# Patient Record
Sex: MALE | Race: WHITE | Employment: UNEMPLOYED | URBAN - METROPOLITAN AREA
[De-identification: names, ages, dates, MRNs, and addresses within clinical notes are randomized per-mention and may not be internally consistent; named-entity substitution may affect disease eponyms.]

---

## 2017-08-25 ENCOUNTER — HOSPITAL ENCOUNTER (OUTPATIENT)
Dept: LAB | Age: 37
Discharge: HOME | End: 2017-08-25

## 2017-08-25 LAB — SENTARA SPECIMEN COL,SENBCF: NORMAL

## 2017-08-25 PROCEDURE — 99001 SPECIMEN HANDLING PT-LAB: CPT | Performed by: PSYCHIATRY & NEUROLOGY

## 2019-05-17 ENCOUNTER — HOSPITAL ENCOUNTER (OUTPATIENT)
Dept: GENERAL RADIOLOGY | Age: 39
Discharge: HOME | End: 2019-05-17
Payer: MEDICAID

## 2019-05-17 DIAGNOSIS — M10.9 GOUT: ICD-10-CM

## 2019-05-17 PROCEDURE — 73620 X-RAY EXAM OF FOOT: CPT

## 2020-10-26 ENCOUNTER — HOSPITAL ENCOUNTER (OUTPATIENT)
Dept: GENERAL RADIOLOGY | Age: 40
Discharge: HOME | End: 2020-10-26
Payer: MEDICAID

## 2020-10-26 ENCOUNTER — TRANSCRIBE ORDER (OUTPATIENT)
Dept: REGISTRATION | Age: 40
End: 2020-10-26

## 2020-10-26 DIAGNOSIS — J32.9 SINUSITIS: Primary | ICD-10-CM

## 2020-10-26 DIAGNOSIS — J32.9 SINUSITIS: ICD-10-CM

## 2020-10-26 PROCEDURE — 71046 X-RAY EXAM CHEST 2 VIEWS: CPT

## 2021-05-19 ENCOUNTER — HOSPITAL ENCOUNTER (OUTPATIENT)
Dept: LAB | Age: 41
Discharge: HOME | End: 2021-05-19

## 2021-05-19 LAB — SENTARA SPECIMEN COL,SENBCF: NORMAL

## 2021-05-19 PROCEDURE — 99001 SPECIMEN HANDLING PT-LAB: CPT

## 2021-05-20 ENCOUNTER — HOSPITAL ENCOUNTER (OUTPATIENT)
Dept: ULTRASOUND IMAGING | Age: 41
Discharge: HOME | End: 2021-05-20
Attending: NURSE PRACTITIONER
Payer: MEDICAID

## 2021-05-20 DIAGNOSIS — N50.89 SCROTAL EDEMA: ICD-10-CM

## 2021-05-20 PROCEDURE — 76870 US EXAM SCROTUM: CPT

## 2021-07-09 ENCOUNTER — OFFICE VISIT (OUTPATIENT)
Dept: SURGERY | Age: 41
End: 2021-07-09
Payer: MEDICAID

## 2021-07-09 VITALS
HEIGHT: 71 IN | WEIGHT: 222.6 LBS | HEART RATE: 96 BPM | BODY MASS INDEX: 31.16 KG/M2 | SYSTOLIC BLOOD PRESSURE: 123 MMHG | DIASTOLIC BLOOD PRESSURE: 80 MMHG | OXYGEN SATURATION: 96 %

## 2021-07-09 DIAGNOSIS — K42.9 UMBILICAL HERNIA WITHOUT OBSTRUCTION AND WITHOUT GANGRENE: Primary | ICD-10-CM

## 2021-07-09 PROCEDURE — 99204 OFFICE O/P NEW MOD 45 MIN: CPT | Performed by: SURGERY

## 2021-07-09 NOTE — PROGRESS NOTES
[facility]  General Surgery    Subjective:      HPI: Patient is a very pleasant 26-year-old male with a past medical history remarkable for hypertension, depression and acid reflux. He was referred by Dr. Gloria Lanza for evaluation and management of umbilical hernia. Patient states he initially thought he had a mass in his bellybutton. He had no pain. Occasionally he bends over or coughs he has discomfort. No surgical history. He does not do heavy lifting but he does carry groceries on the bicycle up 3 flights of stairs regularly. There are no problems to display for this patient. Past Medical History:   Diagnosis Date    Acid indigestion     Depression     Essential hypertension     Headache(784.0)     Muscle ache     Stress       History reviewed. No pertinent surgical history. Family History   Family history unknown: Yes      Social History     Tobacco Use    Smoking status: Former Smoker    Smokeless tobacco: Never Used   Substance Use Topics    Alcohol use: Yes     Comment: tiffanie      Allergies   Allergen Reactions    Iodine Hives    Pcn [Penicillins] Hives       Prior to Admission medications    Medication Sig Start Date End Date Taking? Authorizing Provider   allopurinoL (ZYLOPRIM) 300 mg tablet  5/15/21  Yes Provider, Historical   tadalafiL (CIALIS) 5 mg tablet Take 1 Tablet by mouth daily as needed for Erectile Dysfunction. 5/25/21  Yes Constance Kat MD   albuterol (PROVENTIL HFA, VENTOLIN HFA, PROAIR HFA) 90 mcg/actuation inhaler Take 2 Puffs by inhalation every four (4) hours as needed for Wheezing or Shortness of Breath. 10/27/16  Yes Deidre Tyler PA   acetaminophen (TYLENOL) 325 mg tablet Take 2 Tabs by mouth every six (6) hours as needed for Pain. 10/27/16  Yes Deidre Tyler PA   guaiFENesin-codeine (CHERATUSSIN AC)  mg/5 mL solution Take 5 mL by mouth four (4) times daily as needed for Cough.  Max Daily Amount: 20 mL. 4/6/15 Yes Reyna Panchal   cetirizine (ZYRTEC) 10 mg tablet Take 1 Tab by mouth daily. 4/6/15  Yes COLE Kan   amLODIPine (NORVASC) 5 mg tablet TAKE ONE TABLET BY MOUTH DAILY 1/3/14  Yes Bea Olguin MD   lisinopril-hydrochlorothiazide (PRINZIDE, ZESTORETIC) 20-25 mg per tablet Take  by mouth daily. Yes Provider, Historical   OXcarbazepine (TRILEPTAL) 600 mg tablet Take  by mouth. Yes Provider, Historical       Review of Systems:    14 systems were reviewed. The results are as above in the HPI and otherwise negative. Objective:     Vitals:    07/09/21 1445   BP: 123/80   Pulse: 96   SpO2: 96%   Weight: 101 kg (222 lb 9.6 oz)   Height: 5' 11\" (1.803 m)       Physical Exam:  GENERAL: alert, cooperative, no distress, appears stated age,   EYE: conjunctivae/corneas clear. PERRL, EOM's intact. THROAT & NECK: normal and no erythema or exudates noted. ,    LYMPHATIC: Cervical, supraclavicular, and axillary nodes normal. ,   LUNG: clear to auscultation bilaterally,   HEART: regular rate and rhythm, S1, S2 normal, no murmur, click, rub or gallop,   ABDOMEN: soft, non-tender. Reducible umbilical hernia centimeter defect. Bowel sounds normal. No masses,  no organomegaly,   EXTREMITIES:  extremities normal, atraumatic, no cyanosis or edema,   SKIN: Normal.,   NEUROLOGIC: AOx3. Cranial nerves 2-12 and sensation grossly intact. ,     Data Review: None    Mr. Jose Daniel Mayers has a reminder for a \"due or due soon\" health maintenance. I have asked that he contact his primary care provider for follow-up on this health maintenance. Impression:     · Patient with reducible asymptomatic umbilical hernia. Plan:     · Patient should seek medical attention if the hernia becomes hard has read discoloration in the overlying skin or pain which cannot be controlled with Tylenol.   · Follow-up as needed    Signed By: Zoë Haque MD     July 9, 2021

## 2021-07-09 NOTE — PATIENT INSTRUCTIONS
Abdominal Hernia Repair: Before Your Surgery  What is abdominal hernia repair surgery? An abdominal hernia repair is a type of surgery. It fixes a problem called a hernia. A hernia is a bulge under the skin in your belly. It happens when you have a weak spot in your belly muscles and a piece of your intestines or tissues pokes through your muscles. This can cause pain. You may notice the pain most when you lift something heavy. You can have a hernia near your belly button. Or it may be in a scar from an earlier surgery. To fix it, the doctor will do one of two kinds of surgery. In open surgery, the doctor makes one cut near the hernia. This cut is called an incision. In laparoscopic surgery, the doctor makes several very small incisions and uses a thin, lighted scope and small tools. In either type of surgery, the doctor pushes the bulge back in place, if needed. Then the doctor sews the healthy tissue back together. Often the doctor patches the weak spot with a piece of material.  Laparoscopic surgery leaves several small scars. Open surgery leaves one long scar. The scars fade with time. You will probably need to take 1 to 2 weeks off from work. But if your job requires heavy lifting or other physical work, you may need to take 4 to 6 weeks off. Follow-up care is a key part of your treatment and safety. Be sure to make and go to all appointments, and call your doctor if you are having problems. It's also a good idea to know your test results and keep a list of the medicines you take. How do you prepare for the surgery? Surgery can be stressful. This information will help you understand what you can expect. And it will help you safely prepare for surgery. Preparing for surgery    · Be sure you have someone to take you home.  Anesthesia and pain medicine will make it unsafe for you to drive or get home on your own.     · Understand exactly what surgery is planned, along with the risks, benefits, and other options.     · Tell your doctor ALL the medicines, vitamins, supplements, and herbal remedies you take. Some may increase the risk of problems during your surgery. Your doctor will tell you if you should stop taking any of them before the surgery and how soon to do it.     · If you take aspirin or some other blood thinner, ask your doctor if you should stop taking it before your surgery. Make sure that you understand exactly what your doctor wants you to do. These medicines increase the risk of bleeding.     · Make sure your doctor and the hospital have a copy of your advance directive. If you don't have one, you may want to prepare one. It lets others know your health care wishes. It's a good thing to have before any type of surgery or procedure. .   What happens on the day of surgery? · Follow the instructions exactly about when to stop eating and drinking. If you don't, your surgery may be canceled. If your doctor told you to take your medicines on the day of surgery, take them with only a sip of water.     · Take a bath or shower before you come in for your surgery. Do not apply lotions, perfumes, deodorants, or nail polish.     · Do not shave the surgical site yourself.     · Take off all jewelry and piercings. And take out contact lenses, if you wear them. At the hospital or surgery center   · Bring a picture ID.     · The area for surgery is often marked to make sure there are no errors.     · You will be kept comfortable and safe by your anesthesia provider. You will be asleep during the surgery.     · The surgery will take 30 minutes to 2 hours. It depends on how large the hernia is and where it is. When should you call your doctor? · You have questions or concerns.     · You don't understand how to prepare for your surgery.     · You become ill before the surgery (such as fever, flu, or a cold).     · You need to reschedule or have changed your mind about having the surgery.    Where can you learn more?  Go to http://www.gray.com/  Enter U288 in the search box to learn more about \"Abdominal Hernia Repair: Before Your Surgery. \"  Current as of: April 15, 2020               Content Version: 12.8  © 8915-8058 Healthwise, [employer]. Care instructions adapted under license by [employer] software (which disclaims liability or warranty for this information). If you have questions about a medical condition or this instruction, always ask your healthcare professional. [address] any warranty or liability for your use of this information.

## 2021-07-09 NOTE — PROGRESS NOTES
Anel Aquino is a 46 y.o. male (: 1980) presenting to address:    Chief Complaint   Patient presents with    New Patient     Umbilical hernia x 2 years/ referred by Dr Anastacio Skiff       Medication list and allergies have been reviewed with Anel Aquino and updated as of today's date. I have gone over all Medical, Surgical and Social History with Anel Aquino and updated/added the information accordingly.

## 2021-11-30 ENCOUNTER — HOSPITAL ENCOUNTER (EMERGENCY)
Age: 41
Discharge: HOME | End: 2021-11-30
Attending: STUDENT IN AN ORGANIZED HEALTH CARE EDUCATION/TRAINING PROGRAM
Payer: MEDICAID

## 2021-11-30 VITALS
HEIGHT: 72 IN | WEIGHT: 222.8 LBS | HEART RATE: 75 BPM | TEMPERATURE: 98.1 F | BODY MASS INDEX: 30.18 KG/M2 | SYSTOLIC BLOOD PRESSURE: 145 MMHG | DIASTOLIC BLOOD PRESSURE: 104 MMHG | OXYGEN SATURATION: 96 %

## 2021-11-30 DIAGNOSIS — H61.23 BILATERAL IMPACTED CERUMEN: Primary | ICD-10-CM

## 2021-11-30 PROCEDURE — 99283 EMERGENCY DEPT VISIT LOW MDM: CPT

## 2021-11-30 RX ORDER — AZITHROMYCIN 250 MG/1
TABLET, FILM COATED ORAL
Qty: 6 TABLET | Refills: 0 | Status: SHIPPED | OUTPATIENT
Start: 2021-11-30 | End: 2021-12-05

## 2021-11-30 RX ORDER — ATORVASTATIN CALCIUM 20 MG/1
20 TABLET, FILM COATED ORAL DAILY
COMMUNITY

## 2021-11-30 NOTE — ED PROVIDER NOTES
EMERGENCY DEPARTMENT HISTORY AND PHYSICAL EXAM      Date: 11/30/2021  Patient Name: Iris Granger    History of Presenting Illness     Chief Complaint   Patient presents with    Ear Pain       History Provided By: Patient    HPI: Iris Granger, 46 y.o. male PMHx significant for htn, depression, GERD, presents ambulatory to the ED. Pt presents with decreased hearing to ears b/l. Pt states he had ears irrigated 1 week ago, but he is unsure how much cerumen was removed. Pt states he began using debrox and he feels sx worsened. Denies fever/chills, drainage. There are no other complaints, changes, or physical findings at this time. PCP: Raul Mills MD    No current facility-administered medications on file prior to encounter. Current Outpatient Medications on File Prior to Encounter   Medication Sig Dispense Refill    atorvastatin (LIPITOR) 10 mg tablet Take 10 mg by mouth daily.  allopurinoL (ZYLOPRIM) 300 mg tablet       tadalafiL (CIALIS) 5 mg tablet Take 1 Tablet by mouth daily as needed for Erectile Dysfunction. 90 Tablet 3    albuterol (PROVENTIL HFA, VENTOLIN HFA, PROAIR HFA) 90 mcg/actuation inhaler Take 2 Puffs by inhalation every four (4) hours as needed for Wheezing or Shortness of Breath. 1 Inhaler 0    acetaminophen (TYLENOL) 325 mg tablet Take 2 Tabs by mouth every six (6) hours as needed for Pain. 40 Tab 0    amLODIPine (NORVASC) 5 mg tablet TAKE ONE TABLET BY MOUTH DAILY 30 Tab 0    lisinopril-hydrochlorothiazide (PRINZIDE, ZESTORETIC) 20-25 mg per tablet Take  by mouth daily.  OXcarbazepine (TRILEPTAL) 600 mg tablet Take  by mouth.  guaiFENesin-codeine (CHERATUSSIN AC)  mg/5 mL solution Take 5 mL by mouth four (4) times daily as needed for Cough. Max Daily Amount: 20 mL. (Patient not taking: Reported on 11/30/2021) 150 mL 0    cetirizine (ZYRTEC) 10 mg tablet Take 1 Tab by mouth daily.  10 Tab 0       Past History     Past Medical History:  Past Medical History:   Diagnosis Date    Acid indigestion     Depression     Essential hypertension     Headache(784.0)     Muscle ache     Stress        Past Surgical History:  Past Surgical History:   Procedure Laterality Date    HX HEENT Bilateral     excessive cerumen       Family History:  Family History   Family history unknown: Yes       Social History:  Social History     Tobacco Use    Smoking status: Former Smoker    Smokeless tobacco: Never Used   Vaping Use    Vaping Use: Never used   Substance Use Topics    Alcohol use: Yes     Comment: Dexter Hidalgo Drug use: Never       Allergies: Allergies   Allergen Reactions    Iodine Hives    Pcn [Penicillins] Hives         Review of Systems   Review of Systems   Constitutional: Negative for chills and fever. HENT: Negative for ear pain and facial swelling. Itching and decreased hearing to ears b/l   Eyes: Negative for photophobia and visual disturbance. Respiratory: Negative for shortness of breath. Cardiovascular: Negative for chest pain. Gastrointestinal: Negative for abdominal pain, nausea and vomiting. Genitourinary: Negative for flank pain. Skin: Negative for color change, pallor, rash and wound. Neurological: Negative for dizziness, weakness, light-headedness and headaches. All other systems reviewed and are negative. Physical Exam   Physical Exam  Vitals and nursing note reviewed. Constitutional:       General: He is not in acute distress. Appearance: He is well-developed. Comments: Pt in NAD   HENT:      Head: Normocephalic and atraumatic. Ears:      Comments: Bilateral TM impaction  Not able to visualize TM  No mastoid tenderness  Eyes:      Conjunctiva/sclera: Conjunctivae normal.   Cardiovascular:      Rate and Rhythm: Normal rate and regular rhythm. Heart sounds: Normal heart sounds. Pulmonary:      Effort: Pulmonary effort is normal. No respiratory distress.       Breath sounds: Normal breath sounds. Abdominal:      General: Bowel sounds are normal.      Palpations: Abdomen is soft. Tenderness: There is no abdominal tenderness. Musculoskeletal:         General: Normal range of motion. Skin:     General: Skin is warm. Findings: No rash. Neurological:      Mental Status: He is alert and oriented to person, place, and time. Cranial Nerves: No cranial nerve deficit. Psychiatric:         Behavior: Behavior normal.         Diagnostic Study Results     Labs -   No results found for this or any previous visit (from the past 12 hour(s)). Radiologic Studies -   No orders to display     CT Results  (Last 48 hours)    None        CXR Results  (Last 48 hours)    None          Medical Decision Making   I am the first provider for this patient. I reviewed the vital signs, available nursing notes, past medical history, past surgical history, family history and social history. Vital Signs-Reviewed the patient's vital signs. Patient Vitals for the past 12 hrs:   Temp Pulse BP SpO2   11/30/21 1205 98.1 °F (36.7 °C) 75 (!) 145/104 96 %       Records Reviewed: Nursing Notes and Old Medical Records    Provider Notes (Medical Decision Making):   DDX: Cerumen impaction, OM, OE, FB in ear    47 yo M who presents with decreased hearing to ears b/l. Hx cerumen impaction. On exam, cerumen impaction b/l. Ears irrigated successfully and large amount of cerumen extracted from each ear. TMs and visualized and left TM found to be bulging erythematous. Will treat with antibiotic. Also given ENT follow-up at patient's request. At time of discharge, pt non-toxic appearing in NAD. Pt stable for prompt outpatient follow-up with PCP 1 to 2 days. Patient given strict instructions to return if symptoms worsen. ED Course:   Initial assessment performed. The patients presenting problems have been discussed, and they are in agreement with the care plan formulated and outlined with them.   I have encouraged them to ask questions as they arise throughout their visit. 1321: Pt reevaluated after ear irrigation. TMs not fully visualized. Left TM bulging and erythematous. Will treat with amoxicillin. Disposition:  1:22 PM  Discussed dx and treatment plan. Discussed importance of PCP follow up. All questions answered. Pt voiced they understood. Return if sx worsen. PLAN:  1. Current Discharge Medication List      START taking these medications    Details   azithromycin (Zithromax Z-Rick) 250 mg tablet Take two tabs on the first day, then one tab daily x 4 days  Qty: 6 Tablet, Refills: 0  Start date: 11/30/2021, End date: 12/5/2021           2. Follow-up Information     Follow up With Specialties Details Why Contact Info    Alyssia Segovia MD Family Medicine Schedule an appointment as soon as possible for a visit in 1 day  [address] [place] [address] Dr Jazmine Peñaloza MD Otolaryngology, Surgery Schedule an appointment as soon as possible for a visit in 1 day  [address] [facility] [place] [address]  [phone]      [address] [facility] EMERGENCY DEPT Emergency Medicine  As needed, If symptoms worsen [address] [facility]  [phone]        Return to ED if worse     Diagnosis     Clinical Impression:   1. Bilateral impacted cerumen        Attestations:    Jn Nunez PA    Please note that this dictation was completed with [employer], the computer voice recognition software. Quite often unanticipated grammatical, syntax, homophones, and other interpretive errors are inadvertently transcribed by the computer software. Please disregard these errors. Please excuse any errors that have escaped final proofreading. Thank you.

## 2021-11-30 NOTE — ED TRIAGE NOTES
Patient states Thomas Jodie has been using earbuds a while now and is experiencing bilateral ear pain\". Patient was seen by a provider and given ear drops for his ear wax.

## 2021-11-30 NOTE — ED NOTES
BL ear irrigation complete. Remarkable amounts of cerumen removed from ear canal. Canals/tympanic membrane visible and WDL. Pt. Tolerated procedure well.

## 2022-04-14 ENCOUNTER — HOSPITAL ENCOUNTER (OUTPATIENT)
Dept: LAB | Age: 42
Discharge: HOME | End: 2022-04-14

## 2022-04-14 LAB — SENTARA SPECIMEN COL,SENBCF: NORMAL

## 2022-04-14 PROCEDURE — 99001 SPECIMEN HANDLING PT-LAB: CPT

## 2022-07-11 ENCOUNTER — TRANSCRIBE ORDER (OUTPATIENT)
Dept: REGISTRATION | Age: 42
End: 2022-07-11

## 2022-07-11 ENCOUNTER — HOSPITAL ENCOUNTER (OUTPATIENT)
Dept: GENERAL RADIOLOGY | Age: 42
Discharge: HOME | End: 2022-07-11
Payer: MEDICAID

## 2022-07-11 DIAGNOSIS — I10 ESSENTIAL HYPERTENSION, MALIGNANT: ICD-10-CM

## 2022-07-11 DIAGNOSIS — I10 HTN (HYPERTENSION): ICD-10-CM

## 2022-07-11 DIAGNOSIS — I10 HTN (HYPERTENSION): Primary | ICD-10-CM

## 2022-07-11 PROCEDURE — 73562 X-RAY EXAM OF KNEE 3: CPT

## 2022-08-25 ENCOUNTER — OFFICE VISIT (OUTPATIENT)
Dept: CARDIOLOGY CLINIC | Age: 42
End: 2022-08-25
Payer: MEDICAID

## 2022-08-25 VITALS
HEART RATE: 83 BPM | HEIGHT: 72 IN | BODY MASS INDEX: 29.53 KG/M2 | OXYGEN SATURATION: 98 % | SYSTOLIC BLOOD PRESSURE: 132 MMHG | WEIGHT: 218 LBS | DIASTOLIC BLOOD PRESSURE: 74 MMHG

## 2022-08-25 DIAGNOSIS — R94.31 ABNORMAL EKG: ICD-10-CM

## 2022-08-25 DIAGNOSIS — R07.9 CHEST PAIN, UNSPECIFIED TYPE: Primary | ICD-10-CM

## 2022-08-25 DIAGNOSIS — I15.9 SECONDARY HYPERTENSION: ICD-10-CM

## 2022-08-25 PROCEDURE — 93000 ELECTROCARDIOGRAM COMPLETE: CPT | Performed by: INTERNAL MEDICINE

## 2022-08-25 PROCEDURE — 99204 OFFICE O/P NEW MOD 45 MIN: CPT | Performed by: INTERNAL MEDICINE

## 2022-08-25 RX ORDER — HYDROCHLOROTHIAZIDE 12.5 MG/1
CAPSULE ORAL
COMMUNITY
Start: 2022-07-22

## 2022-08-25 RX ORDER — AMLODIPINE BESYLATE 10 MG/1
10 TABLET ORAL DAILY
COMMUNITY

## 2022-08-25 RX ORDER — LISINOPRIL 40 MG/1
TABLET ORAL
COMMUNITY
Start: 2022-08-19
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
## 2022-08-25 NOTE — PROGRESS NOTES
Regan Livingston presents today for   Chief Complaint   Patient presents with    New Patient     Referred by Dr. Flora Sheikh for abnormal EKG- no complaints            Regan Livingston preferred language for health care discussion is english/other. Is someone accompanying this pt? no    Is the patient using any DME equipment during [address]? no    Depression Screening:  3 most recent PHQ Screens 8/25/2022   Little interest or pleasure in doing things Not at all   Feeling down, depressed, irritable, or hopeless Not at all   Total Score PHQ 2 0       Learning Assessment:  Learning Assessment 7/9/2021   PRIMARY LEARNER Patient   PRIMARY LANGUAGE ENGLISH   LEARNER PREFERENCE PRIMARY READING     DEMONSTRATION     LISTENING     VIDEOS   ANSWERED BY patient   RELATIONSHIP SELF       Abuse Screening:  Abuse Screening Questionnaire 8/25/2022   Do you ever feel afraid of your partner? N   Are you in a relationship with someone who physically or mentally threatens you? N   Is it safe for you to go home? Y       Fall Risk  No flowsheet data found. Pt currently taking Anticoagulant therapy? no    Coordination of Care:  1. Have you been to the ER, urgent care clinic since your last visit? Hospitalized since your last visit? no    2. Have you seen or consulted any other health care providers outside of the [address] [place] [address] since your last visit? Include any pap smears or colon screening.  no

## 2022-09-15 ENCOUNTER — TELEPHONE (OUTPATIENT)
Dept: CARDIOLOGY CLINIC | Age: 42
End: 2022-09-15

## 2022-09-15 NOTE — TELEPHONE ENCOUNTER
----- Message from Debbie Norwood DO sent at 9/15/2022  1:04 PM EDT -----  His echo is normal, thanks    ----- Message -----  From: Shena Mathews LPN  Sent: 6/17/5625   4:53 PM EDT  To: Debbie Norwood DO    Per your last note:    Atypical CP likely GERD  Longstanding HTN  EKG with borderline criteria LAD/ LVH     Echo, will r/o significant LVH etc,   will call with results if WNL, can see me as needed     Thank you for allowing me to participate in the care of your patient, please do not hesitate to call with questions or concerns.

## 2022-10-07 ENCOUNTER — OFFICE VISIT (OUTPATIENT)
Dept: ORTHOPEDIC SURGERY | Age: 42
End: 2022-10-07
Payer: MEDICAID

## 2022-10-07 ENCOUNTER — TELEPHONE (OUTPATIENT)
Dept: PHYSICAL THERAPY | Age: 42
End: 2022-10-07

## 2022-10-07 VITALS
HEART RATE: 88 BPM | OXYGEN SATURATION: 95 % | WEIGHT: 215.8 LBS | TEMPERATURE: 97.7 F | HEIGHT: 72 IN | BODY MASS INDEX: 29.23 KG/M2

## 2022-10-07 DIAGNOSIS — M22.41 CHONDROMALACIA OF RIGHT PATELLA: Primary | ICD-10-CM

## 2022-10-07 DIAGNOSIS — Z87.39 H/O: GOUT: ICD-10-CM

## 2022-10-07 DIAGNOSIS — M25.561 ACUTE PAIN OF RIGHT KNEE: ICD-10-CM

## 2022-10-07 PROCEDURE — 99203 OFFICE O/P NEW LOW 30 MIN: CPT | Performed by: SPECIALIST

## 2022-10-07 RX ORDER — AZITHROMYCIN 250 MG/1
TABLET, FILM COATED ORAL
COMMUNITY
Start: 2022-07-28 | End: 2022-10-07

## 2022-10-07 RX ORDER — IBUPROFEN 800 MG/1
TABLET ORAL
COMMUNITY
Start: 2022-09-26

## 2022-10-07 RX ORDER — CLONAZEPAM 1 MG/1
1 TABLET ORAL 2 TIMES DAILY
COMMUNITY
Start: 2022-09-01

## 2022-10-07 RX ORDER — DICLOFENAC SODIUM 10 MG/G
GEL TOPICAL
COMMUNITY
Start: 2022-07-22

## 2022-10-07 RX ORDER — BUSPIRONE HYDROCHLORIDE 5 MG/1
TABLET ORAL
COMMUNITY
Start: 2022-09-13 | End: 2022-10-07

## 2022-10-07 NOTE — PROGRESS NOTES
Patient: Ivonne Morales                MRN: [id number]       SSN: [id number]  YOB: 1980        AGE: 38 y.o. SEX: male    PCP: Bert Jean MD  10/07/22    Chief Complaint   Patient presents with    Knee Pain     Right       HISTORY:  Ivonne Morales is a 38 y.o. male who is seen for right knee pain related to a recent injury. He felt his right knee pop while he was was playing with his friends step-son in July. He felt lateral knee pain while doing a spin air kick. He feels pain with standing, walking and stair climbing. His pain is mostly lateral. He states that every time he gets up from his couch, he feels a painful pop over the lateral aspect of his knee. He reports that within the past three or four days, his knee has started to feel better, but he is concerned with re-injuring his knee. He states that he sustained a collateral ligament tear to his right knee while playing backyard football 15 years ago. He has a h/o gout. Pain Assessment  10/7/2022   Location of Pain Knee   Location Modifiers Right   Severity of Pain 7   Quality of Pain Sharp;Dull;Aching; Throbbing;Popping   Duration of Pain Persistent   Frequency of Pain Several days a week   Date Pain First Started (No Data)   Date Pain First Started Comment June 2022   Aggravating Factors Exercise;Kneeling;Squatting;Stairs   Limiting Behavior Some   Relieving Factors Rest;Ice;Elevation;NSAID   Result of Injury No     Occupation, etc:  Mr. Zuly Matthew receives social security benefits for bipolar disorder managed with oral medication. 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 He lives alone in [facility]. He has a 15 yo daughter. Mr. Zuly Matthew weighs 218 lbs and is 6'0\" tall. He is hypertensive.      No results found for: HBA1C, YXB7XRSY, FEX0MHEJ, QVS3NOVB  Weight Metrics 10/7/2022 9/14/2022 8/25/2022 11/30/2021 7/9/2021 5/25/2021 3/11/2021   Weight 215 lb 12.8 oz 218 lb 218 lb 222 lb 12.8 oz 222 lb 9.6 oz 200 lb 200 lb   BMI 29.27 kg/m2 29.57 kg/m2 29.57 kg/m2 30.22 kg/m2 31.05 kg/m2 27.12 kg/m2 27.12 kg/m2       There is no problem list on file for this patient. REVIEW OF SYSTEMS:    Constitutional Symptoms: Negative   Eyes: Negative   Ears, Nose, Throat and Mouth: Negative   Cardiovascular: Negative   Respiratory: Negative   Genitourinary: Per HPI   Gastrointestinal: Per HPI   Integumentary (Skin and/or Breast): Negative   Musculoskeletal: Per HPI   Endocrine/Rheumatologic: Negative   Neurological: Per HPI   Hematology/Lymphatic: Negative    Allergic/Immunologic: Negative   Phychiatric: Negative    Social History     Socioeconomic History    Marital status: SINGLE     Spouse name: Not on file    Number of children: Not on file    Years of education: Not on file    Highest education level: Not on file   Occupational History    Not on file   Tobacco Use    Smoking status: Former     Types: Cigarettes    Smokeless tobacco: Never   Vaping Use    Vaping Use: Never used   Substance and Sexual Activity    Alcohol use: Yes     Comment: Rare    Drug use: Never    Sexual activity: Not Currently   Other Topics Concern    Not on file   Social History Narrative    Not on file     Social Determinants of Health     Financial Resource Strain: Not on file   Food Insecurity: Not on file   Transportation Needs: Not on file   Physical Activity: Not on file   Stress: Not on file   Social Connections: Not on file   Intimate Partner Violence: Not on file   Housing Stability: Not on file      Allergies   Allergen Reactions    Iodine Hives    Pcn [Penicillins] Hives      Current Outpatient Medications   Medication Sig    clonazePAM (KlonoPIN) 1 mg tablet Take 1 mg by mouth two (2) times a day.     diclofenac (VOLTAREN) 1 % gel     ibuprofen (MOTRIN) 800 mg tablet     lisinopriL (PRINIVIL, ZESTRIL) 40 mg tablet     hydroCHLOROthiazide (MICROZIDE) 12.5 mg capsule     amLODIPine (NORVASC) 10 mg tablet Take 10 mg by mouth daily. atorvastatin (LIPITOR) 20 mg tablet Take 20 mg by mouth daily. allopurinoL (ZYLOPRIM) 300 mg tablet     tadalafiL (CIALIS) 5 mg tablet Take 1 Tablet by mouth daily as needed for Erectile Dysfunction. albuterol (PROVENTIL HFA, VENTOLIN HFA, PROAIR HFA) 90 mcg/actuation inhaler Take 2 Puffs by inhalation every four (4) hours as needed for Wheezing or Shortness of Breath. acetaminophen (TYLENOL) 325 mg tablet Take 2 Tabs by mouth every six (6) hours as needed for Pain. cetirizine (ZYRTEC) 10 mg tablet Take 1 Tab by mouth daily. OXcarbazepine (TRILEPTAL) 600 mg tablet Take  by mouth. No current facility-administered medications for this visit. PHYSICAL EXAMINATION:  Visit Vitals  Pulse 88   Temp 97.7 °F (36.5 °C) (Temporal)   Ht 6' (1.829 m)   Wt 215 lb 12.8 oz (97.9 kg)   SpO2 95%   BMI 29.27 kg/m²      ORTHO EXAMINATION:  Examination Right knee Left knee   Skin Intact Intact   Range of motion 120-0 130-0   Effusion - -   Medial joint line tenderness - -   Lateral joint line tenderness + -   Popliteal tenderness - -   Osteophytes palpable - -   Brittas - -   Patella crepitus + -   Anterior drawer - -   Lateral laxity - -   Medial laxity - -   Varus deformity - -   Valgus deformity - -   Pretibial edema - -   Calf tenderness - -     RADIOGRAPHS:  XR RIGHT KNEE 7/11/22 [facility] ED  IMPRESSION  No acute fracture or dislocation. IMPRESSION:      ICD-10-CM ICD-9-CM    1. Chondromalacia of right patella  M22.41 717.7 REFERRAL TO PHYSICAL THERAPY      2. Acute pain of right knee  M25.561 719.46 REFERRAL TO PHYSICAL THERAPY      3. H/O: gout  Z87.39 V12.29 REFERRAL TO PHYSICAL THERAPY          PLAN:  He will start a brief course of outpatient physical therapy. There is no need for surgery at this time. We will consider ordering an MRI scan if pain continues. He will follow up as needed.       Scribed by Kris Hobbs) as dictated by Alia Moon MD

## 2022-10-12 ENCOUNTER — HOSPITAL ENCOUNTER (OUTPATIENT)
Dept: PHYSICAL THERAPY | Age: 42
Discharge: HOME | End: 2022-10-12
Attending: SPECIALIST
Payer: MEDICAID

## 2022-10-12 PROCEDURE — 97162 PT EVAL MOD COMPLEX 30 MIN: CPT

## 2022-10-12 NOTE — PROGRESS NOTES
PT DAILY TREATMENT NOTE - [facility]     Patient Name: Bisi Bellamy  Date:10/12/2022  : 1980  [x]  Patient  Verified  Payor: [facility] MEDICAID / Plan: [address] [place] [address] / Product Type: Managed Care Medicaid /    In time:345  Out time:428  Total Treatment Time (min): 43  Visit #: 1     Treatment Area: Chondromalacia of right patella [M22.41]  Acute pain of right knee [M25.561]  H/O: gout [Z87.39]    SUBJECTIVE  Pain Level (0-10 scale): 4/10  Any medication changes, allergies to medications, adverse drug reactions, diagnosis change, or new procedure performed?: [x] No    [] Yes (see summary sheet for update)  Subjective functional status/changes:   [] No changes reported  See eval    OBJECTIVE    45 min []Eval                  []Re-Eval             With   [] TE   [] TA   [] neuro   [] other: Patient Education: [x] Review HEP    [] Progressed/Changed HEP based on:   [] positioning   [] body mechanics   [] transfers   [] heat/ice application    [] other:      Other Objective/Functional Measures: see eval      Pain Level (0-10 scale) post treatment: 4/10    ASSESSMENT/Changes in Function: see poc    Patient will continue to benefit from skilled PT services to modify and progress therapeutic interventions, address functional mobility deficits, address ROM deficits, address strength deficits, analyze and address soft tissue restrictions, analyze and cue movement patterns, analyze and modify body mechanics/ergonomics, assess and modify postural abnormalities, address imbalance/dizziness, and instruct in home and community integration to attain remaining goals.      [x]  See Plan of Care  []  See progress note/recertification  []  See Discharge Summary         Progress towards goals / Updated goals:  See poc    PLAN  [x]  Upgrade activities as tolerated     [x]  Continue plan of care  []  Update interventions per flow sheet       []  Discharge due to:_  []  Other:_      Mesfin Duran PT 10/12/2022  9:52 AM    Future Appointments   Date Time Provider José Miguel Calvo   10/12/2022  3:45 PM Alana Zamarripa, PT [facility]

## 2022-10-12 NOTE — THERAPY EVALUATION
[facility]  [address] [place] [address]  [phone] fax    Plan of Care/ Statement of Necessity for Physical Therapy Services    Patient name: Guille Oquendo Start of Care: 10/12/2022   Referral source: Familia Hernandez MD : 1980    Medical Diagnosis: Chondromalacia of right patella [M22.41]  Acute pain of right knee [M25.561]  H/O: gout [Z87.39]  Payor: [facility] MEDICAID / Plan: [address] [place] [address] / Product Type: Managed Care Medicaid /  Onset Date:2022    Treatment Diagnosis: Right Knee Pain    Provider#: [id number]   Medications: Verified on Patient summary List    Comorbidities: ADHD, Bipolar, HTN, Gout, Visual disturbance. Prior Level of Function: Likes to play Basketball, Tennis, most Sports     The Plan of Care and following information is based on the information from the initial evaluation. Assessment/ key information: Pt is a 38 yr old male who reports right lateral knee pain after he felt a \"pop\" to his distal IT band and lateral quad after doing a round house air spin kick with some friends. Pain currently increases with rising up from a low seated position, stairs and any \"awkward\" positions. Pain levels  increases to where he is unable to walk or place weight onto his right LE at times. Pt also reports multiple ankle sprains from playing sports when he was younger. Right knee ROM=0- 130 deg. Quad and HS=4+/5. Varus stress at 30 deg is positive. Anterior Drawer Test is Negative. Pt presents with right ITband tightness distal tenderness. SL hip Abd=4/5. H   Pt will benefit from skilled PT to improve knee function ,decrease pain and improve stability.        Evaluation Complexity History LOW Complexity : Zero comorbidities / personal factors that will impact the outcome / POC; Examination LOW Complexity : 1-2 Standardized tests and measures addressing body structure, function, activity limitation and / or participation in recreation  ;Presentation LOW Complexity : Stable, uncomplicated  ;Clinical Decision Making MEDIUM Complexity : FOTO score of 26-74  Overall Complexity Rating: LOW   Problem List: pain affecting function, decrease ROM, decrease strength, edema affecting function, impaired gait/ balance, decrease ADL/ functional abilitiies, decrease activity tolerance, decrease flexibility/ joint mobility, and decrease transfer abilities   Treatment Plan may include any combination of the following: Therapeutic exercise, Neuromuscular reeducation, Manual therapy, Therapeutic activity, Electric stim unattended , Vasopneumatic device, and Ultrasound  Patient / Family readiness to learn indicated by: asking questions, trying to perform skills, and interest  Persons(s) to be included in education: patient (P)  Barriers to Learning/Limitations: None  Patient Goal (s): help with knee issue  Patient Self Reported Health Status: good  Rehabilitation Potential: good    Short Term Goals: To be accomplished in 1 weeks:   1. Pt will be compliant with a HEP to improve sx. Eval: Issued  Long Term Goals: To be accomplished in 6 weeks:   1. Pt will increase FOTO score by  17 pts to improve function. Eval:FOTO=58   2. Pt will increase right Quad and HS strength to 5/5 to ease with ADL's. Eval:Quad and HS =4+/5   3. Pt will report pain at worst <3/10 to ease with ADL's/Ambulation. Eval:Pain at worst =10/10   4. Pt will be able to perform light activities around his house with no difficulty. Eval:A little bit of difficulty. Frequency / Duration: Patient to be seen 2-3 times per week for 6 weeks.     Patient/  Caregiver education and instruction: Diagnosis, prognosis, self care, activity modification, and exercises   [x]  Plan of care has been reviewed with DAIN Pérez, PT 10/12/2022 9:53 AM    ________________________________________________________________________    I certify that the above Therapy Services are being furnished while the patient is under my care. I agree with the treatment plan and certify that this therapy is necessary.     [de-identified] Signature:____________Date:_________TIME:________     Ryan Stovall MD  ** Signature, Date and Time must be completed for valid certification **    Please sign and return to [facility]  [address] [place] [address]  [phone] fax

## 2022-10-14 ENCOUNTER — HOSPITAL ENCOUNTER (OUTPATIENT)
Dept: PHYSICAL THERAPY | Age: 42
Discharge: HOME | End: 2022-10-14
Attending: SPECIALIST
Payer: MEDICAID

## 2022-10-14 PROCEDURE — 97530 THERAPEUTIC ACTIVITIES: CPT

## 2022-10-14 PROCEDURE — 97112 NEUROMUSCULAR REEDUCATION: CPT

## 2022-10-14 PROCEDURE — 97110 THERAPEUTIC EXERCISES: CPT

## 2022-10-19 ENCOUNTER — HOSPITAL ENCOUNTER (OUTPATIENT)
Dept: PHYSICAL THERAPY | Age: 42
Discharge: HOME | End: 2022-10-19
Attending: SPECIALIST
Payer: MEDICAID

## 2022-10-19 ENCOUNTER — APPOINTMENT (OUTPATIENT)
Dept: PHYSICAL THERAPY | Age: 42
End: 2022-10-19

## 2022-10-19 PROCEDURE — 97112 NEUROMUSCULAR REEDUCATION: CPT

## 2022-10-19 PROCEDURE — 97530 THERAPEUTIC ACTIVITIES: CPT

## 2022-10-19 PROCEDURE — 97110 THERAPEUTIC EXERCISES: CPT

## 2022-10-19 NOTE — PROGRESS NOTES
PT DAILY TREATMENT NOTE     Patient Name: Margarita Prasad  Date:10/19/2022  : 1980  [x]  Patient  Verified  Payor: [facility] MEDICAID / Plan: [address] [place] [address] / Product Type: Managed Care Medicaid /    In time: 4:28  Out time: 5:25  Total Treatment Time (min): 62  Visit #: 3 of 18    Treatment Area: Pain in right knee [M25.561]    SUBJECTIVE  Pain Level (0-10 scale): 0/10  Any medication changes, allergies to medications, adverse drug reactions, diagnosis change, or new procedure performed?: [x] No    [] Yes (see summary sheet for update)  Subjective functional status/changes:   [] No changes reported  Pt reports he has been massaging  the ITB at home and walking up and down the stairs trying to stretch but he hasn't done too much of the HEP. He is a little scared because he doesn't want to feel the pain again.      OBJECTIVE    Modality rationale: decrease edema, decrease inflammation, decrease pain, and increase tissue extensibility to improve the patients ability to improve ease of ambulation   Min Type Additional Details    [] Estim:  []Unatt       []IFC  []Premod                        []Other:  []w/ice   []w/heat  Position:  Location:    [] Estim: []Att    []TENS instruct  []NMES                    []Other:  []w/US   []w/ice   []w/heat  Position:  Location:    []  Traction: [] Cervical       []Lumbar                       [] Prone          []Supine                       []Intermittent   []Continuous Lbs:  [] before manual  [] after manual    []  Ultrasound: []Continuous   [] Pulsed                           []1MHz   []3MHz W/cm2:  Location:    []  Iontophoresis with dexamethasone         Location: [] Take home patch   [] In clinic   10 [x]  Ice     []  heat  []  Ice massage  []  Laser   []  Anodyne Position: supine  Location: right knee    []  Laser with stim  []  Other:  Position:  Location:    []  Vasopneumatic Device    []  Right     []  Left  Pre-treatment girth:  Post-treatment girth: Measured at (location):  Pressure:       [] lo [] med [] hi   Temperature: [] lo [] med [] hi   [x] Skin assessment post-treatment:  [x]intact []redness- no adverse reaction    []redness - adverse reaction:       22 min Therapeutic Exercise:  [x] See flow sheet :   Rationale: increase ROM, increase strength, improve coordination, improve balance, and increase proprioception to improve the patients ability to play sports    15 min Therapeutic Activity:  [x]  See flow sheet : pt education, bridges   Rationale: increase ROM, increase strength, improve coordination, improve balance, and increase proprioception  to improve the patients ability to play sports     10 min Neuromuscular Re-education:  [x]  See flow sheet :   Rationale: increase ROM, increase strength, improve coordination, improve balance, and increase proprioception  to improve the patients ability to play sports          With   [] TE   [] TA   [] neuro   [] other: Patient Education: [x] Review HEP    [] Progressed/Changed HEP based on:   [] positioning   [] body mechanics   [] transfers   [] heat/ice application    [] other:      Other Objective/Functional Measures:   - pt extremely talkative and anxious about possible injury and need for MRI/surgery  - initiated incline board S  - pain with right HS S  - minimal stretch felt with JOANNE and hip flexor S    Pain Level (0-10 scale) post treatment: 0/10 \"numb\"    ASSESSMENT/Changes in Function:   Patient tolerated session well with frequent redirection to keep him on task and with proper form d/t pt very talkative. Pain with SLR with QS and HS S on right LE. Educated on importance of compliance with HEP and on DOMS. Will progress reps and resistance as able while monitoring sx.      Patient will continue to benefit from skilled PT services to modify and progress therapeutic interventions, address functional mobility deficits, address ROM deficits, address strength deficits, analyze and address soft tissue restrictions, analyze and cue movement patterns, analyze and modify body mechanics/ergonomics, assess and modify postural abnormalities, address imbalance/dizziness, and instruct in home and community integration to attain remaining goals. [x]  See Plan of Care  []  See progress note/recertification  []  See Discharge Summary         Short Term Goals: To be accomplished in 1 weeks:               1. Pt will be compliant with a HEP to improve sx. Eval: Issued  Current: compliant with stretching only (10/19/22)    Long Term Goals: To be accomplished in 6 weeks:               1. Pt will increase FOTO score by  17 pts to improve function. Eval:FOTO=58                 2. Pt will increase right Quad and HS strength to 5/5 to ease with ADL's. Eval:Quad and HS =4+/5                 3. Pt will report pain at worst <3/10 to ease with ADL's/Ambulation. Eval:Pain at worst =10/10                 4. Pt will be able to perform light activities around his house with no difficulty. Eval:A little bit of difficulty.     PLAN  [x]  Upgrade activities as tolerated     [x]  Continue plan of care  []  Update interventions per flow sheet       []  Discharge due to:_  []  Other:       Horris Sandifer, DAIN 10/19/2022  5:25 PM    Future Appointments   Date Time Provider José Miguel Calvo   10/19/2022  4:30 PM Alicia De La Paz [facility]   10/21/2022  3:00 PM Alicia De La Paz [facility]   10/26/2022  3:45 PM Inés Bynum, PT [facility]   10/28/2022  3:00 PM Zoilaagh Number, PTA [facility]   11/2/2022  4:30 PM Inés Bynum, PT [facility]   11/4/2022  3:00 PM Shelagh Number, PTA [facility]   11/9/2022  3:45 PM Shelagh Number, PTA [facility]   11/11/2022  3:45 PM Inés Bynum PT [facility]

## 2022-10-21 ENCOUNTER — HOSPITAL ENCOUNTER (OUTPATIENT)
Dept: PHYSICAL THERAPY | Age: 42
Discharge: HOME | End: 2022-10-21
Attending: SPECIALIST
Payer: MEDICAID

## 2022-10-21 PROCEDURE — 97110 THERAPEUTIC EXERCISES: CPT

## 2022-10-21 PROCEDURE — 97530 THERAPEUTIC ACTIVITIES: CPT

## 2022-10-21 PROCEDURE — 97112 NEUROMUSCULAR REEDUCATION: CPT

## 2022-10-26 ENCOUNTER — HOSPITAL ENCOUNTER (OUTPATIENT)
Dept: PHYSICAL THERAPY | Age: 42
Discharge: HOME | End: 2022-10-26
Attending: SPECIALIST
Payer: MEDICAID

## 2022-10-26 PROCEDURE — 97110 THERAPEUTIC EXERCISES: CPT

## 2022-10-26 PROCEDURE — 97112 NEUROMUSCULAR REEDUCATION: CPT

## 2022-10-26 PROCEDURE — 97530 THERAPEUTIC ACTIVITIES: CPT

## 2022-10-26 NOTE — PROGRESS NOTES
PT DAILY TREATMENT NOTE - [facility]     Patient Name: Eliot Burris  Date:10/26/2022  : 1980  [x]  Patient  Verified  Payor: [facility] MEDICAID / Plan: [address] [place] [address] / Product Type: Managed Care Medicaid /    In time:350  Out time:445  Total Treatment Time (min): 54  Visit #: 5 of     Treatment Area: Pain in right knee [M25.561]    SUBJECTIVE  Pain Level (0-10 scale): 3/10  Any medication changes, allergies to medications, adverse drug reactions, diagnosis change, or new procedure performed?: [x] No    [] Yes (see summary sheet for update)  Subjective functional status/changes:   [] No changes reported  Pain is more prominent     OBJECTIVE    Modality rationale: decrease pain and increase tissue extensibility to improve the patients ability to ease with adl's   Min Type Additional Details    [] Estim:  []Unatt       []IFC  []Premod                        []Other:  []w/ice   []w/heat  Position:  Location:    [] Estim: []Att    []TENS instruct  []NMES                    []Other:  []w/US   []w/ice   []w/heat  Position:  Location:    []  Traction: [] Cervical       []Lumbar                       [] Prone          []Supine                       []Intermittent   []Continuous Lbs:  [] before manual  [] after manual    []  Ultrasound: []Continuous   [] Pulsed                           []1MHz   []3MHz W/cm2:  Location:    []  Iontophoresis with dexamethasone         Location: [] Take home patch   [] In clinic   10 [x]  Ice     []  heat  []  Ice massage  []  Laser   []  Anodyne Position:Supine  Location:right knee    []  Laser with stim  []  Other:  Position:  Location:    []  Vasopneumatic Device Pressure:       [] lo [] med [] hi   Temperature: [] lo [] med [] hi   [] Skin assessment post-treatment:  []intact []redness- no adverse reaction     25 min Therapeutic Exercise:  [x] See flow sheet :   Rationale: increase ROM, increase strength, improve coordination, improve balance, and increase proprioception to improve the patients ability to play sports     10 min Therapeutic Activity:  [x]  See flow sheet : pt education, bridges   Rationale: increase ROM, increase strength, improve coordination, improve balance, and increase proprioception  to improve the patients ability to play sports     10 min Neuromuscular Re-education:  [x]  See flow sheet :   Rationale: increase ROM, increase strength, improve coordination, improve balance, and increase proprioception  to improve the patients ability to play sports                                                                     With   [] TE   [] TA   [] neuro   [] other: Patient Education: [x] Review HEP    [] Progressed/Changed HEP based on:   [] positioning   [] body mechanics   [] transfers   [] heat/ice application    [] other:      Other Objective/Functional Measures: Progressed pt per tolerance to ex's. - requires cues for proper form and to stay on task.   -tolerated ex's with minimal complaints. Pain Level (0-10 scale) post treatment: 3/10    ASSESSMENT/Changes in Function: Progressing steadily. Pt reports he is progressing and feels like his knee is becoming stronger although he sometimes leave PT session more sore. He will feel better if he has an MRI to determine if meniscus is involved. Patient will continue to benefit from skilled PT services to modify and progress therapeutic interventions, address functional mobility deficits, address ROM deficits, address strength deficits, analyze and address soft tissue restrictions, analyze and cue movement patterns, analyze and modify body mechanics/ergonomics, assess and modify postural abnormalities, address imbalance/dizziness, and instruct in home and community integration to attain remaining goals. [x]  See Plan of Care  []  See progress note/recertification  []  See Discharge Summary         Progress towards goals / Updated goals:    1. Pt will be compliant with a HEP to improve sx. Eval: Issued  Current: compliant with stretching only (10/19/22), compliant (10/21/22)     Long Term Goals: To be accomplished in 6 weeks:  1. Pt will increase FOTO score by  17 pts to improve function. Eval:FOTO=58     2. Pt will increase right Quad and HS strength to 5/5 to ease with ADL's. Eval:Quad and HS =4+/5     3. Pt will report pain at worst <3/10 to ease with ADL's/Ambulation. Eval:Pain at worst =10/10     4. Pt will be able to perform light activities around his house with no difficulty. Eval:A little bit of difficulty.   PLAN  [x]  Upgrade activities as tolerated     [x]  Continue plan of care  []  Update interventions per flow sheet       []  Discharge due to:_  []  Other:_      Calista Fields, PT 10/26/2022  10:01 AM    Future Appointments   Date Time Provider José Miguel Calvo   10/26/2022  3:45 PM Alana Meza, PT [facility]   10/28/2022  3:00 PM Livia Shaper [facility]   11/2/2022  4:30 PM Alana Meza, PT [facility]   11/4/2022  3:00 PM Blakely Given, PTA [facility]   11/9/2022  3:45 PM Blakely Given, PTA [facility]   11/11/2022  3:45 PM Alana Meza, PT [facility]

## 2022-10-28 ENCOUNTER — HOSPITAL ENCOUNTER (OUTPATIENT)
Dept: PHYSICAL THERAPY | Age: 42
Discharge: HOME | End: 2022-10-28
Attending: SPECIALIST
Payer: MEDICAID

## 2022-10-28 PROCEDURE — 97112 NEUROMUSCULAR REEDUCATION: CPT

## 2022-10-28 PROCEDURE — 97110 THERAPEUTIC EXERCISES: CPT

## 2022-10-28 PROCEDURE — 97530 THERAPEUTIC ACTIVITIES: CPT

## 2022-10-28 NOTE — PROGRESS NOTES
PT DAILY TREATMENT NOTE - [facility]     Patient Name: Guille Oquendo  Date:10/28/2022  : 1980  [x]  Patient  Verified  Payor: [facility] MEDICAID / Plan: [address] [place] [address] / Product Type: Managed Care Medicaid /    In time:300  Out time:348  Total Treatment Time (min): 48  Visit #: 6 of     Treatment Area: Pain in right knee [M25.561]    SUBJECTIVE  Pain Level (0-10 scale): 10  Any medication changes, allergies to medications, adverse drug reactions, diagnosis change, or new procedure performed?: [x] No    [] Yes (see summary sheet for update)  Subjective functional status/changes:   [] No changes reported  Reports he saw the NP and requested an MRI. Pt reports tightness along the side of his thigh.      OBJECTIVE    Modality rationale: decrease pain and increase tissue extensibility to improve the patients ability to ease with adl's   Min Type Additional Details    [] Estim:  []Unatt       []IFC  []Premod                        []Other:  []w/ice   []w/heat  Position:  Location:    [] Estim: []Att    []TENS instruct  []NMES                    []Other:  []w/US   []w/ice   []w/heat  Position:  Location:    []  Traction: [] Cervical       []Lumbar                       [] Prone          []Supine                       []Intermittent   []Continuous Lbs:  [] before manual  [] after manual    []  Ultrasound: []Continuous   [] Pulsed                           []1MHz   []3MHz W/cm2:  Location:    []  Iontophoresis with dexamethasone         Location: [] Take home patch   [] In clinic   10 [x]  Ice     []  heat  []  Ice massage  []  Laser   []  Anodyne Position:supine  Location:right knee    []  Laser with stim  []  Other:  Position:  Location:    []  Vasopneumatic Device Pressure:       [] lo [] med [] hi   Temperature: [] lo [] med [] hi   [] Skin assessment post-treatment:  []intact []redness- no adverse reaction       23 min Therapeutic Exercise:  [x] See flow sheet :   Rationale: increase ROM, increase strength, improve coordination, improve balance, and increase proprioception to improve the patients ability to play sports     7 min Therapeutic Activity:  [x]  See flow sheet : pt education, bridges   Rationale: increase ROM, increase strength, improve coordination, improve balance, and increase proprioception  to improve the patients ability to play sports     8 min Neuromuscular Re-education:  [x]  See flow sheet :   Rationale: increase ROM, increase strength, improve coordination, improve balance, and increase proprioception  to improve the patients ability to play sports                                                                 With   [] TE   [] TA   [] neuro   [] other: Patient Education: [x] Review HEP    [] Progressed/Changed HEP based on:   [] positioning   [] body mechanics   [] transfers   [] heat/ice application    [] other:      Other Objective/Functional Measures: IT band irritation during ex's decreased with ball rolls and IT band stretching. Pain Level (0-10 scale) post treatment: 2/10    ASSESSMENT/Changes in Function: Progressing steadily. Pt reports he is progressing and feels like his knee is becoming stronger although he sometimes leave PT session more sore. He will feel better if he has an MRI to determine i    Patient will continue to benefit from skilled PT services to modify and progress therapeutic interventions, address functional mobility deficits, address ROM deficits, address strength deficits, analyze and address soft tissue restrictions, analyze and cue movement patterns, analyze and modify body mechanics/ergonomics, assess and modify postural abnormalities, and address imbalance/dizziness to attain remaining goals. []  See Plan of Care  []  See progress note/recertification  []  See Discharge Summary         Progress towards goals / Updated goals:     1. Pt will be compliant with a HEP to improve sx.    Eval: Issued  Current: compliant with stretching only (10/19/22), compliant (10/21/22)     Long Term Goals: To be accomplished in 6 weeks:  1. Pt will increase FOTO score by  17 pts to improve function. Eval:FOTO=58     2. Pt will increase right Quad and HS strength to 5/5 to ease with ADL's. Eval:Quad and HS =4+/5     3. Pt will report pain at worst <3/10 to ease with ADL's/Ambulation. Eval:Pain at worst =10/10     4. Pt will be able to perform light activities around his house with no difficulty. Eval:A little bit of difficulty.     PLAN  [x]  Upgrade activities as tolerated     [x]  Continue plan of care  []  Update interventions per flow sheet       []  Discharge due to:_  []  Other:_      Kelli Stratton, PT 10/28/2022  11:52 AM    Future Appointments   Date Time Provider José Miguel Calvo   10/28/2022  3:00 PM Siva Meza, PT [facility]   11/2/2022  4:30 PM Siva Meza, PT [facility]   11/4/2022  3:00 PM Jazmin Parra, PTA [facility]   11/9/2022  3:45 PM Jazmin Parra, PTA [facility]   11/11/2022  3:45 PM Siva Meza, PT [facility]

## 2022-11-02 ENCOUNTER — HOSPITAL ENCOUNTER (OUTPATIENT)
Dept: PHYSICAL THERAPY | Age: 42
Discharge: HOME | End: 2022-11-02
Attending: SPECIALIST
Payer: MEDICAID

## 2022-11-02 PROCEDURE — 97530 THERAPEUTIC ACTIVITIES: CPT

## 2022-11-02 PROCEDURE — 97110 THERAPEUTIC EXERCISES: CPT

## 2022-11-02 PROCEDURE — 97112 NEUROMUSCULAR REEDUCATION: CPT

## 2022-11-02 NOTE — PROGRESS NOTES
PT DAILY TREATMENT NOTE - [facility]     Patient Name: Ignacia Joshi  Date:2022  : 1980  [x]  Patient  Verified  Payor: [facility] MEDICAID / Plan: [address] [place] [address] / Product Type: Managed Care Medicaid /    In time:434  Out time:524  Total Treatment Time (min): 50    Visit #: 7 of     Treatment Area: Pain in right knee [M25.561]    SUBJECTIVE  Pain Level (0-10 scale): 1-2/10  Any medication changes, allergies to medications, adverse drug reactions, diagnosis change, or new procedure performed?: [x] No    [] Yes (see summary sheet for update)  Subjective functional status/changes:   [] No changes reported  Pt reports he feels like is knee is doing better and is feeling stronger since starting therapy. He is waiting a call from MD office to set up an MRI.      OBJECTIVE    Modality rationale: decrease pain to improve the patients ability to ease with adl's   Min Type Additional Details    [] Estim:  []Unatt       []IFC  []Premod                        []Other:  []w/ice   []w/heat  Position:  Location:    [] Estim: []Att    []TENS instruct  []NMES                    []Other:  []w/US   []w/ice   []w/heat  Position:  Location:    []  Traction: [] Cervical       []Lumbar                       [] Prone          []Supine                       []Intermittent   []Continuous Lbs:  [] before manual  [] after manual    []  Ultrasound: []Continuous   [] Pulsed                           []1MHz   []3MHz W/cm2:  Location:    []  Iontophoresis with dexamethasone         Location: [] Take home patch   [] In clinic   10 [x]  Ice     []  heat  []  Ice massage  []  Laser   []  Anodyne Position:SL  Location:right IT band    []  Laser with stim  []  Other:  Position:  Location:    []  Vasopneumatic Device Pressure:       [] lo [] med [] hi   Temperature: [] lo [] med [] hi   [] Skin assessment post-treatment:  []intact []redness- no adverse reaction     23 min Therapeutic Exercise:  [x] See flow sheet : Rationale: increase ROM, increase strength, improve coordination, improve balance, and increase proprioception to improve the patients ability to play sports     9 min Therapeutic Activity:  [x]  See flow sheet : pt education, bridges   Rationale: increase ROM, increase strength, improve coordination, improve balance, and increase proprioception  to improve the patients ability to play sports     8 min Neuromuscular Re-education:  [x]  See flow sheet :   Rationale: increase ROM, increase strength, improve coordination, improve balance, and increase proprioception  to improve the patients ability to play sports    With   [] TE   [] TA   [] neuro   [] other: Patient Education: [x] Review HEP    [] Progressed/Changed HEP based on:   [] positioning   [] body mechanics   [] transfers   [] heat/ice application    [] other:      Other Objective/Functional Measures: progressed to seated wall SLR (Abd-Add over a standing ankle weight)   - added wall lean toe taps  -corrected TRX squats/thighs parallel to the ground and not below. Pain Level (0-10 scale) post treatment: 1/10    ASSESSMENT/Changes in Function: Progressing well. Pt reports he lives on the 3rd floor and has to ambulate steps that is becoming a little less difficult. He wants to test his knee but does not want to re injure himself. Patient will continue to benefit from skilled PT services to modify and progress therapeutic interventions, address functional mobility deficits, address ROM deficits, address strength deficits, analyze and address soft tissue restrictions, analyze and cue movement patterns, analyze and modify body mechanics/ergonomics, assess and modify postural abnormalities, and address imbalance/dizziness to attain remaining goals. []  See Plan of Care  [x]  See progress note/recertification  []  See Discharge Summary         Progress towards goals / Updated goals:  1. Pt will be compliant with a HEP to improve sx.    Eval: Issued  Current: compliant with stretching only (10/19/22), compliant (10/21/22)     Long Term Goals: To be accomplished in 6 weeks:  1. Pt will increase FOTO score by  17 pts to improve function. Eval:FOTO=58     2. Pt will increase right Quad and HS strength to 5/5 to ease with ADL's. Eval:Quad and HS =4+/5     3. Pt will report pain at worst <3/10 to ease with ADL's/Ambulation. Eval:Pain at worst =10/10   Current: Pain= 1-2/10, met. 11/2/22  4. Pt will be able to perform light activities around his house with no difficulty. Eval:A little bit of difficulty.        PLAN  [x]  Upgrade activities as tolerated     [x]  Continue plan of care  []  Update interventions per flow sheet       []  Discharge due to:_  []  Other:_      Parish Coe, PT 11/2/2022  2:15 PM    Future Appointments   Date Time Provider José Miguel Calvo   11/2/2022  4:30 PM Leticia Blake, PT [facility]   11/4/2022  3:00 PM Jacques Geronimo, PTA [facility]   11/9/2022  3:45 PM Jacques Geronimo, PTA [facility]   11/11/2022  3:45 PM Leticia Blake, PT [facility]

## 2022-11-04 ENCOUNTER — HOSPITAL ENCOUNTER (OUTPATIENT)
Dept: PHYSICAL THERAPY | Age: 42
Discharge: HOME | End: 2022-11-04
Attending: SPECIALIST
Payer: MEDICAID

## 2022-11-04 PROCEDURE — 97110 THERAPEUTIC EXERCISES: CPT

## 2022-11-04 PROCEDURE — 97112 NEUROMUSCULAR REEDUCATION: CPT

## 2022-11-04 PROCEDURE — 97530 THERAPEUTIC ACTIVITIES: CPT

## 2022-11-09 ENCOUNTER — TRANSCRIBE ORDER (OUTPATIENT)
Dept: SCHEDULING | Age: 42
End: 2022-11-09

## 2022-11-09 ENCOUNTER — HOSPITAL ENCOUNTER (OUTPATIENT)
Dept: PHYSICAL THERAPY | Age: 42
Discharge: HOME | End: 2022-11-09
Attending: SPECIALIST
Payer: MEDICAID

## 2022-11-09 DIAGNOSIS — M25.562 BILATERAL KNEE PAIN: Primary | ICD-10-CM

## 2022-11-09 DIAGNOSIS — M25.561 BILATERAL KNEE PAIN: Primary | ICD-10-CM

## 2022-11-09 DIAGNOSIS — M25.561 RIGHT KNEE PAIN: ICD-10-CM

## 2022-11-09 PROCEDURE — 97112 NEUROMUSCULAR REEDUCATION: CPT

## 2022-11-09 PROCEDURE — 97110 THERAPEUTIC EXERCISES: CPT

## 2022-11-09 PROCEDURE — 97530 THERAPEUTIC ACTIVITIES: CPT

## 2022-11-09 NOTE — PROGRESS NOTES
PT DAILY TREATMENT NOTE     Patient Name: Ivonne Morales  Date:2022  : 1980  [x]  Patient  Verified  Payor: [facility] MEDICAID / Plan: [address] [place] [address] / Product Type: Managed Care Medicaid /    In time: 3:50  Out time: 5:00  Total Treatment Time (min): 70 min  Visit #:  of 18    Treatment Area: Pain in right knee [M25.561]    SUBJECTIVE  Pain Level (0-10 scale): /10  Any medication changes, allergies to medications, adverse drug reactions, diagnosis change, or new procedure performed?: [x] No    [] Yes (see summary sheet for update)  Subjective functional status/changes:   [] No changes reported  Talked to MD on Friday about getting an MRI waiting to hear back. Pt reported IT band STM helped from last session but did experience some soreness after. He also stated that he experiences pain when getting up from his low couch at home. Pt reports pain at worse is a 2-3/10.        OBJECTIVE    Modality rationale: decrease pain to improve the patients ability to ease with adl's   Min Type Additional Details    [] Estim:  []Unatt       []IFC  []Premod                        []Other:  []w/ice   []w/heat  Position:  Location:    [] Estim: []Att    []TENS instruct  []NMES                    []Other:  []w/US   []w/ice   []w/heat  Position:  Location:    []  Traction: [] Cervical       []Lumbar                       [] Prone          []Supine                       []Intermittent   []Continuous Lbs:  [] before manual  [] after manual    []  Ultrasound: []Continuous   [] Pulsed                           []1MHz   []3MHz W/cm2:  Location:    []  Iontophoresis with dexamethasone         Location: [] Take home patch   [] In clinic   10  [x] Ice     []  heat  []  Ice massage  []  Laser   []  Anodyne Position: supine  Location: right anterior knee    []  Laser with stim  []  Other:  Position:  Location:    []  Vasopneumatic Device    []  Right     []  Left  Pre-treatment girth:  Post-treatment girth: Measured at (location):  Pressure:       [] lo [] med [] hi   Temperature: [] lo [] med [] hi   [x] Skin assessment post-treatment:  [x]intact []redness- no adverse reaction             20 min Therapeutic Exercise:  [x] See flow sheet :   Rationale: increase ROM, increase strength, improve coordination, improve balance, and increase proprioception to improve the patients ability to play sports     30 min Therapeutic Activity:  [x]  See flow sheet : reassessed goals   Rationale: increase ROM, increase strength, improve coordination, improve balance, and increase proprioception  to improve the patients ability to play sports     10 min Neuromuscular Re-education:  [x]  See flow sheet :   Rationale: increase ROM, increase strength, improve coordination, improve balance, and increase proprioception  to improve the patients ability to play sports      With   [] TE   [x] TA   [] neuro   [] other: Patient Education: [x] Review HEP    [] Progressed/Changed HEP based on:   [] positioning   [x] body mechanics   [] transfers   [] heat/ice application    [] other:      Other Objective/Functional Measures:    Right knee flex/ext MMT: 5/5  Right hip ext MMT: 5/5  FOTO: 59/100  Cued for QS with SLR  Educated on body mechanics for sit to stands from the couch and to reduce twisting when standing  Discussed D/C planning and pt to decrease frequency to 1 x week until MRI performed    Pain Level (0-10 scale) post treatment: 1-2/10    ASSESSMENT/Changes in Function: See progress note    Patient will continue to benefit from skilled PT services to modify and progress therapeutic interventions, address functional mobility deficits, address ROM deficits, address strength deficits, analyze and address soft tissue restrictions, analyze and cue movement patterns, analyze and modify body mechanics/ergonomics, assess and modify postural abnormalities, and address imbalance/dizziness to attain remaining goals.      [x]  See Plan of Care  [x]  See progress note/recertification  []  See Discharge Summary         Progress towards goals / Updated goals:    1. Pt will be compliant with a HEP to improve sx. Eval: Issued  Current: compliant with stretching only (10/19/22), compliant (10/21/22)     Long Term Goals: To be accomplished in 6 weeks:  1. Pt will increase FOTO score by  17 pts to improve function. Eval:FOTO=58  (11/9/22) FOTO= 59     2. Pt will increase right Quad and HS strength to 5/5 to ease with ADL's. Eval:Quad and HS =4+/5  (11/9/22) Right quad and HS= 5/5      3. Pt will report pain at worst <3/10 to ease with ADL's/Ambulation. Eval:Pain at worst =10/10   Current: Pain= 1-2/10, met. 11/2/22  (11/9/22) 2-3/10    4. Pt will be able to perform light activities around his house with no difficulty. Eval:A little bit of difficulty. \"A little bit of difficulty\" (11/9/22)       PLAN  [x]  Upgrade activities as tolerated     [x]  Continue plan of care  []  Update interventions per flow sheet       []  Discharge due to:_  []  Other:_      FELICIA Hicks 11/9/2022  3:27 PM    I was present during the entire treatment, directing and participating in the treatment.    Sigifredo Ozzie, LILY     Future Appointments   Date Time Provider José Miguel Calvo   11/9/2022  3:45 PM Yaritza Tobias [facility]   11/11/2022  3:45 PM Orly Broderick, PT [facility]

## 2022-11-09 NOTE — THERAPY RECERTIFICATION
[facility] - Lennie Kim  [address] [facility]  [phone] fax    Physical Therapy Progress Note  Patient name: Dionicio Orlando Start of Care: 10/12/2022   Referral source: Yessica Arzola MD : 1980   Medical/Treatment Diagnosis: Pain in right knee [M25.561]  Payor: [facility] MEDICAID / Plan: [address] [place] [address],[facility] / Product Type: Managed Care Medicaid /  Onset Date:2022     Prior Hospitalization: see medical history Provider#: [id number]   Medications: Verified on Patient Summary List    Comorbidities: ADHD, Bipolar, HTN, Gout, Visual disturbance. Prior Level of Function: Likes to play Basketball, Tennis, most Sports     Visits from [facility]: 9    Missed Visits: 0    Established Goals:         Excellent           Good         Limited           None  [] Increased ROM   []  []  []  []  [x] Increased Strength  []  [x]  []  []  [x] Increased Mobility  []  [x]  []  []   [x] Decreased Pain   []  [x]  []  []  [] Decreased Swelling  []  []  []  []    Key Functional Changes: FOTO:59/100; HEP compliance; decreased pain  Updated Goals: to be achieved in 4 weeks:  1. Patient will improve FOTO at least 16 points (74/100) to demonstrate improvement in functional mobility. 2. Patient will report \"no difficulty\" when asked on the FOTO questionnaire, \"How much difficulty do you have performing light activities around your home? \" To demonstrate improved ease of performing household chores. 3. Patient will report 75% improvement since start of care to improve QOL. 4. Patient will report <2/10 pain with performing sit to stands from the couch and return to running to return to PLOF. ASSESSMENT/RECOMMENDATIONS: Mr. Irena Sharma is making good progress in physical therapy in 9 visits. He is compliant with his HEP and reports decreased pain to 2-3/10. He continues to have difficulty with running and standing from the couch.  The patient's main concern is that there could be an underlying tear so he has fear/apprehension with progression until a potential MRI to confirm and rule out ligamentous involvement. His overall right LE strength is 5/5. His FOTO increased to 59/100. Skilled PT remains medically necessary to progress pt back into running activities and performing deeper knee flexion activities without increased right knee pain. [x]Continue therapy per initial plan/protocol at a frequency of  1-2 x per week for 4 weeks  []Continue therapy with the following recommended changes:_____________________      _____________________________________________________________________  []Discontinue therapy progressing towards or have reached established goals  []Discontinue therapy due to lack of appreciable progress towards goals  []Discontinue therapy due to lack of attendance or compliance  []Await Physician's recommendations/decisions regarding therapy  []Other:________________________________________________________________    Thank you for this referral.   Nely Nicole, PTA 11/9/2022 6:14 PM    NOTE TO PHYSICIAN:  [address]   FAX TO [facility]: [phone]  If you are unable to process this request in 24 hours please contact our office: [phone]    I have read the above report and request that my patient continue as recommended. I have read the above report and request that my patient continue therapy with the following changes/special instructions:____________________________________  I have read the above report and request that my patient be discharged from therapy.     Physicians signature: ______________________________Date: ______Time:______     Orly Sutherland MD

## 2022-11-11 ENCOUNTER — HOSPITAL ENCOUNTER (OUTPATIENT)
Dept: PHYSICAL THERAPY | Age: 42
Discharge: HOME | End: 2022-11-11
Attending: SPECIALIST
Payer: MEDICAID

## 2022-11-11 PROCEDURE — 97110 THERAPEUTIC EXERCISES: CPT

## 2022-11-11 PROCEDURE — 97112 NEUROMUSCULAR REEDUCATION: CPT

## 2022-11-11 PROCEDURE — 97530 THERAPEUTIC ACTIVITIES: CPT

## 2022-11-11 NOTE — PROGRESS NOTES
PT DAILY TREATMENT NOTE - [facility]     Patient Name: Glenn Nesbitt  Date:2022  : 1980  [x]  Patient  Verified  Payor: [facility] MEDICAID / Plan: [facility] [address] / Product Type: Managed Care Medicaid /    In time:345  Out time:440  Total Treatment Time (min): 54  Visit #: 1of 1-4    Treatment Area: Pain in right knee [M25.561]    SUBJECTIVE  Pain Level (0-10 scale): 1-2/10  Any medication changes, allergies to medications, adverse drug reactions, diagnosis change, or new procedure performed?: [x] No    [] Yes (see summary sheet for update)  Subjective functional status/changes:   [] No changes reported  Reports he \"tweaked\" hid left knee but not sure what happened. Has an order for MRI in December for right knee.    OBJECTIVE    Modality rationale: decrease pain to improve the patients ability to ease with adl's   Min Type Additional Details    [] Estim:  []Unatt       []IFC  []Premod                        []Other:  []w/ice   []w/heat  Position:  Location:    [] Estim: []Att    []TENS instruct  []NMES                    []Other:  []w/US   []w/ice   []w/heat  Position:  Location:    []  Traction: [] Cervical       []Lumbar                       [] Prone          []Supine                       []Intermittent   []Continuous Lbs:  [] before manual  [] after manual    []  Ultrasound: []Continuous   [] Pulsed                           []1MHz   []3MHz W/cm2:  Location:    []  Iontophoresis with dexamethasone         Location: [] Take home patch   [] In clinic   10 [x]  Ice     []  heat  []  Ice massage  []  Laser   []  Anodyne Position:  Location:    []  Laser with stim  []  Other:  Position:  Location:    []  Vasopneumatic Device Pressure:       [] lo [] med [] hi   Temperature: [] lo [] med [] hi   [] Skin assessment post-treatment:  []intact []redness- no adverse reaction     20 min Therapeutic Exercise:  [x] See flow sheet :   Rationale: increase ROM, increase strength, improve coordination, improve balance, and increase proprioception to improve the patients ability to play sports     15 min Therapeutic Activity:  [x]  See flow sheet : reassessed goals   Rationale: increase ROM, increase strength, improve coordination, improve balance, and increase proprioception  to improve the patients ability to play sports     10 min Neuromuscular Re-education:  [x]  See flow sheet :   Rationale: increase ROM, increase strength, improve coordination, improve balance, and increase proprioception  to improve the patients ability to play sports                With   [] TE   [] TA   [] neuro   [] other: Patient Education: [x] Review HEP    [] Progressed/Changed HEP based on:   [] positioning   [] body mechanics   [] transfers   [] heat/ice application    [] other:      Other Objective/Functional Measures:  - no pain reported during ex's.  -lateral band walk with band around ankle and above knees. -cues to keep feet from ER during band walk and maintain a slight squat.   -needs redirection at times. Pain Level (0-10 scale) post treatment: 1-2/10    ASSESSMENT/Changes in Function: Progressing well and may continue but not sure. He has 1 more visit next Friday. Pt reminded to avoid an aggravating positions/activity. His knee is not too bad going down stairs. He lives on the 3rd floor. Patient will continue to benefit from skilled PT services to modify and progress therapeutic interventions, address functional mobility deficits, address ROM deficits, address strength deficits, analyze and address soft tissue restrictions, analyze and cue movement patterns, analyze and modify body mechanics/ergonomics, assess and modify postural abnormalities, and address imbalance/dizziness to attain remaining goals. [x]  See Plan of Care  []  See progress note/recertification  []  See Discharge Summary         Progress towards goals / Updated goals:  1.  Patient will improve FOTO at least 16 points (74/100) to demonstrate improvement in functional mobility. 2. Patient will report \"no difficulty\" when asked on the FOTO questionnaire, \"How much difficulty do you have performing light activities around your home? \" To demonstrate improved ease of performing household chores. 3. Patient will report 75% improvement since start of care to improve QOL. 4. Patient will report <2/10 pain with performing sit to stands from the couch and return to running to return to PLOF.         PLAN  [x]  Upgrade activities as tolerated     [x]  Continue plan of care  []  Update interventions per flow sheet       []  Discharge due to:_  []  Other:_      Thea Gasca, PT 11/11/2022  3:42 PM    Future Appointments   Date Time Provider José Miguel Calvo   11/11/2022  3:45 PM Tricia Mcknight, PT [facility]   11/18/2022  3:00 PM Saida Castellon, PTA [facility]   12/6/2022  6:15 PM [facility] MRI RM 1 [facility]

## 2022-11-18 ENCOUNTER — HOSPITAL ENCOUNTER (OUTPATIENT)
Dept: PHYSICAL THERAPY | Age: 42
Discharge: HOME | End: 2022-11-18
Attending: SPECIALIST
Payer: MEDICAID

## 2022-11-18 PROCEDURE — 97110 THERAPEUTIC EXERCISES: CPT

## 2022-11-18 PROCEDURE — 97112 NEUROMUSCULAR REEDUCATION: CPT

## 2022-11-18 PROCEDURE — 97530 THERAPEUTIC ACTIVITIES: CPT

## 2022-11-18 NOTE — PROGRESS NOTES
PT DAILY TREATMENT NOTE - [facility]     Patient Name: Eliot Burris  Date:2022  : 1980  [x]  Patient  Verified  Payor: [employer] Jeferson / Plan: [address] [place] [address] / Product Type: Managed Care Medicaid /    In time: 3:02  Out time: 3:52  Total Treatment Time (min): 50  Visit #: 2 of 1-4    Treatment Area: Pain in right knee [M25.561]    SUBJECTIVE  Pain Level (0-10 scale): 0/10  Any medication changes, allergies to medications, adverse drug reactions, diagnosis change, or new procedure performed?: [x] No    [] Yes (see summary sheet for update)  Subjective functional status/changes:   [] No changes reported  Patient wants to continue maybe 1 times per week until his MRI on 22.     OBJECTIVE    Modality rationale: decrease pain to improve the patients ability to ease with adl's   Min Type Additional Details    [] Estim:  []Unatt       []IFC  []Premod                        []Other:  []w/ice   []w/heat  Position:  Location:    [] Estim: []Att    []TENS instruct  []NMES                    []Other:  []w/US   []w/ice   []w/heat  Position:  Location:    []  Traction: [] Cervical       []Lumbar                       [] Prone          []Supine                       []Intermittent   []Continuous Lbs:  [] before manual  [] after manual    []  Ultrasound: []Continuous   [] Pulsed                           []1MHz   []3MHz W/cm2:  Location:    []  Iontophoresis with dexamethasone         Location: [] Take home patch   [] In clinic   10 [x]  Ice     []  heat  []  Ice massage  []  Laser   []  Anodyne Position: supine  Location: right anterior knee     []  Laser with stim  []  Other:  Position:  Location:    []  Vasopneumatic Device Pressure:       [] lo [] med [] hi   Temperature: [] lo [] med [] hi   [x] Skin assessment post-treatment:  [x]intact []redness- no adverse reaction     20 min Therapeutic Exercise:  [x] See flow sheet :   Rationale: increase ROM, increase strength, improve coordination, improve balance, and increase proprioception to improve the patients ability to play sports     10 min Therapeutic Activity:  [x]  See flow sheet :    Rationale: increase ROM, increase strength, improve coordination, improve balance, and increase proprioception  to improve the patients ability to play sports     10 min Neuromuscular Re-education:  [x]  See flow sheet :   Rationale: increase ROM, increase strength, improve coordination, improve balance, and increase proprioception  to improve the patients ability to play sports           With   [] TE   [] TA   [] neuro   [] other: Patient Education: [x] Review HEP    [] Progressed/Changed HEP based on:   [] positioning   [] body mechanics   [] transfers   [] heat/ice application    [] other:      Other Objective/Functional Measures:  - challenged with maintaining good seated posture on plinth with LAQ/HSC  - cuing to prevent valsalva    Pain Level (0-10 scale) post treatment: 0/10    ASSESSMENT/Changes in Function:   Pt unable to decide if he wants to continue at 1 times per week until his MRI or just wait. He tolerated session well however required moderate cuing to decrease valsalva today and demonstrates difficulty maintaining seated posture while performing LAQ and HSC. Visible tremors with right LAQ d/t weakness. Patient discouraged from playing basketball until after he has MRI and results. Patient will continue to benefit from skilled PT services to modify and progress therapeutic interventions, address functional mobility deficits, address ROM deficits, address strength deficits, analyze and address soft tissue restrictions, analyze and cue movement patterns, analyze and modify body mechanics/ergonomics, assess and modify postural abnormalities, and address imbalance/dizziness to attain remaining goals. [x]  See Plan of Care  []  See progress note/recertification  []  See Discharge Summary         Progress towards goals / Updated goals:  1.  Patient will improve FOTO at least 16 points (74/100) to demonstrate improvement in functional mobility. 2. Patient will report \"no difficulty\" when asked on the FOTO questionnaire, \"How much difficulty do you have performing light activities around your home? \" To demonstrate improved ease of performing household chores. 3. Patient will report 75% improvement since start of care to improve QOL. 4. Patient will report <2/10 pain with performing sit to stands from the couch and return to running to return to PLOF.     PLAN  [x]  Upgrade activities as tolerated     [x]  Continue plan of care  []  Update interventions per flow sheet       []  Discharge due to:_  []  Other:_      Mecca Chapman, DAIN 11/18/2022  3:52 PM    Future Appointments   Date Time Provider José Miguel Calvo   11/18/2022  3:00 PM Casimiro Santa [facility]   12/6/2022  6:15 PM [facility] MRI RM 1 [facility]

## 2022-12-02 ENCOUNTER — APPOINTMENT (OUTPATIENT)
Dept: PHYSICAL THERAPY | Age: 42
End: 2022-12-02
Attending: SPECIALIST

## 2022-12-06 ENCOUNTER — HOSPITAL ENCOUNTER (OUTPATIENT)
Dept: MRI IMAGING | Age: 42
Discharge: HOME | End: 2022-12-06
Attending: PHYSICIAN ASSISTANT
Payer: MEDICAID

## 2022-12-06 DIAGNOSIS — M25.561 RIGHT KNEE PAIN: ICD-10-CM

## 2022-12-06 PROCEDURE — 73721 MRI JNT OF LWR EXTRE W/O DYE: CPT

## 2023-02-20 ENCOUNTER — OFFICE VISIT (OUTPATIENT)
Age: 43
End: 2023-02-20
Payer: COMMERCIAL

## 2023-02-20 VITALS
OXYGEN SATURATION: 97 % | BODY MASS INDEX: 30.34 KG/M2 | HEIGHT: 72 IN | WEIGHT: 224 LBS | TEMPERATURE: 98.2 F | HEART RATE: 78 BPM

## 2023-02-20 DIAGNOSIS — S83.281A OTHER TEAR OF LATERAL MENISCUS OF RIGHT KNEE AS CURRENT INJURY, INITIAL ENCOUNTER: Primary | ICD-10-CM

## 2023-02-20 DIAGNOSIS — S83.241A OTHER TEAR OF MEDIAL MENISCUS OF RIGHT KNEE AS CURRENT INJURY, INITIAL ENCOUNTER: ICD-10-CM

## 2023-02-20 PROCEDURE — 99213 OFFICE O/P EST LOW 20 MIN: CPT | Performed by: SPECIALIST

## 2023-02-20 NOTE — PROGRESS NOTES
Patient: Nilsa Lawson                MRN:  [id number]       SSN: [id number]   YOB: 1980        AGE:  38 y.o. SEX: male      PCP: Velvet Gottron, MD   2/20/23             Chief Complaint       Patient presents with          Knee Pain             Right           HISTORY:  Nilsa Lawson is a 38 y.o. male who is seen for a recent right knee  injury. He felt his right knee pop while he was  was playing with his friends step-son in July. He felt lateral knee pain while doing a spin air kick. He is doing much better. He is not experiencing any significant pain at this time. He states that he sustained a collateral ligament tear to his right knee while playing [employer] football 15 years ago. He has a h/o gout. Pain Assessment   10/7/2022        Location of Pain  Knee     Location Modifiers  Right     Severity of Pain  7     Quality of Pain  Sharp;Dull;Aching; Throbbing;Popping     Duration of Pain  Persistent     Frequency of Pain  Several days a week     Date Pain First Started  (No Data)     Date Pain First Started Comment  June 2022     Aggravating Factors  Exercise;Kneeling;Squatting;Stairs     Limiting Behavior  Some     Relieving Factors  Rest;Ice;Elevation;NSAID        Result of Injury  No        Occupation, etc:  Mr. Florencio Monroy receives social security benefits for bipolar disorder managed with oral medication. 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. He lives alone in [place]. He has a 15 yo daughter. Mr. Florencio Monroy weighs 218 lbs and is 6'0\" tall. He is hypertensive.        No results found for: HBA1C, [id number], YKP7UCKF, BPC8BREK            Weight Metrics  10/7/2022  9/14/2022  8/25/2022  11/30/2021  7/9/2021  5/25/2021  3/11/2021              Weight  215 lb 12.8 oz  218 lb  218 lb  222 lb 12.8 oz  222 lb 9.6 oz  200 lb  200 lb              BMI  29.27 kg/m2  29.57 kg/m2  29.57 kg/m2  30.22 kg/m2  31.05 kg/m2 27.12 kg/m2  27.12 kg/m2           There is no problem list on file for this patient. REVIEW OF SYSTEMS:               Constitutional Symptoms: Negative              Eyes: Negative              Ears, Nose, Throat and Mouth: Negative              Cardiovascular: Negative              Respiratory: Negative              Genitourinary: Per HPI              Gastrointestinal: Per HPI              Integumentary (Skin and/or Breast): Negative              Musculoskeletal: Per HPI              Endocrine/Rheumatologic: Negative              Neurological: Per HPI              Hematology/Lymphatic: Negative               Allergic/Immunologic: Negative              Phychiatric: Negative        Social History                Socioeconomic History           Marital status:  SINGLE              Spouse name:  Not on file           Number of children:  Not on file       Years of education:  Not on file       Highest education level:  Not on file       Occupational History          Not on file       Tobacco Use           Smoking status:  Former              Types:  Cigarettes           Smokeless tobacco:  Never       Vaping Use           Vaping Use:  Never used       Substance and Sexual Activity           Alcohol use:   Yes             Comment: Rare           Drug use:  Never       Sexual activity:  Not Currently        Other Topics  Concern          Not on file       Social History Narrative          Not on file          Social Determinants of Health          Financial Resource Strain: Not on file     Food Insecurity: Not on file     Transportation Needs: Not on file     Physical Activity: Not on file     Stress: Not on file     Social Connections: Not on file     Intimate Partner Violence: Not on file       Housing Stability: Not on file                Allergies        Allergen  Reactions           Iodine  Hives           Pcn [Penicillins]  Hives                Current Outpatient Medications        Medication  Sig clonazePAM (KlonoPIN) 1 mg tablet  Take 1 mg by mouth two (2) times a day. diclofenac (VOLTAREN) 1 % gel         ibuprofen (MOTRIN) 800 mg tablet         lisinopriL (PRINIVIL, ZESTRIL) 40 mg tablet         hydroCHLOROthiazide (MICROZIDE) 12.5 mg capsule         amLODIPine (NORVASC) 10 mg tablet  Take 10 mg by mouth daily. atorvastatin (LIPITOR) 20 mg tablet  Take 20 mg by mouth daily. allopurinoL (ZYLOPRIM) 300 mg tablet         tadalafiL (CIALIS) 5 mg tablet  Take 1 Tablet by mouth daily as needed for Erectile Dysfunction. albuterol (PROVENTIL HFA, VENTOLIN HFA, PROAIR HFA) 90 mcg/actuation inhaler  Take 2 Puffs by inhalation every four (4) hours as needed for Wheezing or Shortness of Breath. acetaminophen (TYLENOL) 325 mg tablet  Take 2 Tabs by mouth every six (6) hours as needed for Pain. cetirizine (ZYRTEC) 10 mg tablet  Take 1 Tab by mouth daily. OXcarbazepine (TRILEPTAL) 600 mg tablet  Take  by mouth. No current facility-administered medications for this visit. PHYSICAL EXAMINATION:   Visit Vitals      Pulse  88     Temp  97.7 °F (36.5 °C) (Temporal)     Ht  6' (1.829 m)     Wt  215 lb 12.8 oz (97.9 kg)     SpO2  95%        BMI  29.27 kg/m²         ORTHO EXAMINATION:       Examination  Right knee  Left knee         Skin  Intact  Intact         Range of motion  120-0  130-0     Effusion  -  -     Medial joint line tenderness  +  -     Lateral joint line tenderness  +  -     Popliteal tenderness  -  -     Osteophytes palpable  -  -     Ezra's  -  -     Patella crepitus  +  -     Anterior drawer  -  -     Lateral laxity  -  -     Medial laxity  -  -     Varus deformity  -  -     Valgus deformity  -  -     Pretibial edema  -  -         Calf tenderness  -  -        RADIOGRAPHS:   XR RIGHT KNEE 7/11/22 [facility] ED   IMPRESSION   No acute fracture or dislocation. MRI RIGHT KNEE 12/6/22 [facility]      Impression   1.  Horizontally oriented peripheral lateral meniscal tear with parameniscal   cysts at the anterior horn. 2. Grade 1 LCL complex injury. 3. Complex tear of the medial meniscal body/posterior horn periphery. 4. Mild chondromalacia of the lateral patellar facet. IMPRESSION:    Encounter Diagnoses   Name Primary? Other tear of lateral meniscus of right knee as current injury, initial encounter Yes    Other tear of medial meniscus of right knee as current injury, initial encounter              PLAN:  There is no need for surgery at this time. He will follow up as needed.

## 2023-08-16 NOTE — PROGRESS NOTES
PT DAILY TREATMENT NOTE     Patient Name: Jorge Sneed  ZUOF:  : 1980  [x]  Patient  Verified  Payor: [facility] MEDICAID / Plan: [address] [place] [address] / Product Type: Managed Care Medicaid /    In time: 2:56  Out time: 3:48  Total Treatment Time (min): 52  Visit #: 4 of 18    Treatment Area: Pain in right knee [M25.561]    SUBJECTIVE  Pain Level (0-10 scale): 0/10  Any medication changes, allergies to medications, adverse drug reactions, diagnosis change, or new procedure performed?: [x] No    [] Yes (see summary sheet for update)  Subjective functional status/changes:   [] No changes reported  Patient reports he was a little sore after last session, he isn't used to bending all different ways.      OBJECTIVE    Modality rationale: decrease edema, decrease inflammation, decrease pain, and increase tissue extensibility to improve the patients ability to improve ease of ambulation   Min Type Additional Details    [] Estim:  []Unatt       []IFC  []Premod                        []Other:  []w/ice   []w/heat  Position:  Location:    [] Estim: []Att    []TENS instruct  []NMES                    []Other:  []w/US   []w/ice   []w/heat  Position:  Location:    []  Traction: [] Cervical       []Lumbar                       [] Prone          []Supine                       []Intermittent   []Continuous Lbs:  [] before manual  [] after manual    []  Ultrasound: []Continuous   [] Pulsed                           []1MHz   []3MHz W/cm2:  Location:    []  Iontophoresis with dexamethasone         Location: [] Take home patch   [] In clinic   10 [x]  Ice     []  heat  []  Ice massage  []  Laser   []  Anodyne Position: supine with wedge  Location: right knee    []  Laser with stim  []  Other:  Position:  Location:    []  Vasopneumatic Device    []  Right     []  Left  Pre-treatment girth:  Post-treatment girth:  Measured at (location):  Pressure:       [] lo [] med [] hi   Temperature: [] lo [] med [] hi [x] Skin assessment post-treatment:  [x]intact []redness- no adverse reaction    []redness - adverse reaction:       22 min Therapeutic Exercise:  [x] See flow sheet :   Rationale: increase ROM, increase strength, improve coordination, improve balance, and increase proprioception to improve the patients ability to play sports    10 min Therapeutic Activity:  [x]  See flow sheet : pt education, bridges   Rationale: increase ROM, increase strength, improve coordination, improve balance, and increase proprioception  to improve the patients ability to play sports     10 min Neuromuscular Re-education:  [x]  See flow sheet :   Rationale: increase ROM, increase strength, improve coordination, improve balance, and increase proprioception  to improve the patients ability to play sports          With   [] TE   [] TA   [] neuro   [] other: Patient Education: [x] Review HEP    [] Progressed/Changed HEP based on:   [] positioning   [] body mechanics   [] transfers   [] heat/ice application    [] other:      Other Objective/Functional Measures:   - light progression of reps as noted on FS  - cuing to prevent valsalva with bridges  - c/o burning with QS during SLRs  - c/o burning with wall ball clams  - cuing for form and pace with TRX squats  - cuing for form with GTB side stepping    Pain Level (0-10 scale) post treatment: 0/10     ASSESSMENT/Changes in Function:   Patient tolerated light progression of reps with minimal increase in pain/burning. He requires constant redirection to stay on task as well as assistance counting reps and holds as he is easily distracted. Will continue to progress as tolerated.     Patient will continue to benefit from skilled PT services to modify and progress therapeutic interventions, address functional mobility deficits, address ROM deficits, address strength deficits, analyze and address soft tissue restrictions, analyze and cue movement patterns, analyze and modify body mechanics/ergonomics, assess and modify postural abnormalities, address imbalance/dizziness, and instruct in home and community integration to attain remaining goals. [x]  See Plan of Care  []  See progress note/recertification  []  See Discharge Summary         Short Term Goals: To be accomplished in 1 weeks:  1. Pt will be compliant with a HEP to improve sx. Eval: Issued  Current: compliant with stretching only (10/19/22), compliant (10/21/22)    Long Term Goals: To be accomplished in 6 weeks:  1. Pt will increase FOTO score by  17 pts to improve function. Eval:FOTO=58    2. Pt will increase right Quad and HS strength to 5/5 to ease with ADL's. Eval:Quad and HS =4+/5    3. Pt will report pain at worst <3/10 to ease with ADL's/Ambulation. Eval:Pain at worst =10/10    4. Pt will be able to perform light activities around his house with no difficulty. Eval:A little bit of difficulty.     PLAN  [x]  Upgrade activities as tolerated     [x]  Continue plan of care  []  Update interventions per flow sheet       []  Discharge due to:_  []  Other:       Silvia Amaro, DAIN 10/21/2022  3:48 PM    Future Appointments   Date Time Provider José Miguel Calvo   10/21/2022  3:00 PM Ponce Splinter [facility]   10/26/2022  3:45 PM Lance Cap, PT [facility]   10/28/2022  3:00 PM [facility], PTA [facility]   11/2/2022  4:30 PM Lance Cap, PT [facility]   11/4/2022  3:00 PM [facility], PTA [facility]   11/9/2022  3:45 PM [facility], PTA [facility]   11/11/2022  3:45 PM Lance Cap, PT [facility] 0

## 2025-05-19 ENCOUNTER — HOSPITAL ENCOUNTER (EMERGENCY)
Age: 45
Discharge: HOME | End: 2025-05-19
Payer: MEDICAID

## 2025-05-19 VITALS
HEIGHT: 71 IN | BODY MASS INDEX: 29.4 KG/M2 | DIASTOLIC BLOOD PRESSURE: 89 MMHG | WEIGHT: 210 LBS | HEART RATE: 94 BPM | TEMPERATURE: 98.4 F | OXYGEN SATURATION: 97 % | SYSTOLIC BLOOD PRESSURE: 106 MMHG | RESPIRATION RATE: 14 BRPM

## 2025-05-19 DIAGNOSIS — H61.23 BILATERAL IMPACTED CERUMEN: Primary | ICD-10-CM

## 2025-05-19 PROCEDURE — 99283 EMERGENCY DEPT VISIT LOW MDM: CPT

## 2025-05-19 PROCEDURE — 6370000000 HC RX 637 (ALT 250 FOR IP)

## 2025-05-19 RX ORDER — ACETAMINOPHEN 325 MG/1
650 TABLET ORAL
Status: COMPLETED | OUTPATIENT
Start: 2025-05-19 | End: 2025-05-19

## 2025-05-19 RX ADMIN — ACETAMINOPHEN 650 MG: 325 TABLET ORAL at 17:50

## 2025-05-19 RX ADMIN — CARBAMIDE PEROXIDE 6.5% 10 DROP: 6.5 LIQUID AURICULAR (OTIC) at 15:54

## 2025-05-19 ASSESSMENT — PAIN SCALES - GENERAL: PAINLEVEL_OUTOF10: 7

## 2025-05-19 ASSESSMENT — LIFESTYLE VARIABLES
HOW OFTEN DO YOU HAVE A DRINK CONTAINING ALCOHOL: NEVER
HOW MANY STANDARD DRINKS CONTAINING ALCOHOL DO YOU HAVE ON A TYPICAL DAY: PATIENT DOES NOT DRINK

## 2025-05-19 ASSESSMENT — PAIN - FUNCTIONAL ASSESSMENT: PAIN_FUNCTIONAL_ASSESSMENT: 0-10

## 2025-05-19 NOTE — ED PROVIDER NOTES
(ZYLOPRIM) 300 mg tablet      amLODIPine (NORVASC) 10 MG tablet Take 1 tablet by mouth daily      atorvastatin (LIPITOR) 20 MG tablet Take 1 tablet by mouth daily      cetirizine (ZYRTEC) 10 MG tablet Take 1 tablet by mouth daily      hydroCHLOROthiazide (MICROZIDE) 12.5 MG capsule ceived the following from [facility]: Outside name: hydroCHLOROthiazide (MICROZIDE) 12.5 mg capsule      lisinopril (PRINIVIL;ZESTRIL) 40 MG tablet ceived the following from [facility]: Outside name: lisinopriL (PRINIVIL, ZESTRIL) 40 mg tablet      OXcarbazepine (TRILEPTAL) 600 MG tablet Take by mouth      tadalafil (CIALIS) 5 MG tablet Take 1 tablet by mouth daily as needed         Allergies:  Allergies   Allergen Reactions    Iodine Hives    Penicillins Hives       Social Determinants of Health:  Social Drivers of Health     Tobacco Use: Medium Risk (5/19/2025)    Patient History     Smoking Tobacco Use: Former     Smokeless Tobacco Use: Never     Passive Exposure: Not on file   Alcohol Use: Not At Risk (5/19/2025)    AUDIT-C     Frequency of Alcohol Consumption: Never     Average Number of Drinks: Patient does not drink     Frequency of Binge Drinking: Never   Financial Resource Strain: Not on file   Food Insecurity: Not on file   Transportation Needs: Not on file   Physical Activity: Not on file   Stress: Not on file   Social Connections: Not on file   Intimate Partner Violence: Not on file   Depression: Not at risk (8/25/2022)    PHQ-2     PHQ-2 Score: 0   Housing Stability: Not on file   Interpersonal Safety: Not on file   Utilities: Not on file       Social Determinants affecting Dx or Tx: None  Review of Systems   Review of Systems   Negative except as listed above in HPI.    Physical Exam   Physical Exam  Vitals and nursing note reviewed.   Constitutional:       Appearance: Normal appearance.   HENT:      Head: Normocephalic and atraumatic.      Right Ear: Ear canal and external ear normal. There

## 2025-05-19 NOTE — ED TRIAGE NOTES
Pt states that he has had R ear pain \"a couple days\". He has had wax impactions before, still uses Q tips and feels like he has a wax impaction again.